# Patient Record
Sex: FEMALE | Race: WHITE | NOT HISPANIC OR LATINO | URBAN - METROPOLITAN AREA
[De-identification: names, ages, dates, MRNs, and addresses within clinical notes are randomized per-mention and may not be internally consistent; named-entity substitution may affect disease eponyms.]

---

## 2021-08-04 ENCOUNTER — APPOINTMENT (OUTPATIENT)
Age: 46
Setting detail: DERMATOLOGY
End: 2021-08-04

## 2021-08-04 DIAGNOSIS — D22 MELANOCYTIC NEVI: ICD-10-CM

## 2021-08-04 DIAGNOSIS — L81.4 OTHER MELANIN HYPERPIGMENTATION: ICD-10-CM

## 2021-08-04 DIAGNOSIS — L71.0 PERIORAL DERMATITIS: ICD-10-CM

## 2021-08-04 DIAGNOSIS — L91.8 OTHER HYPERTROPHIC DISORDERS OF THE SKIN: ICD-10-CM

## 2021-08-04 PROBLEM — D22.4 MELANOCYTIC NEVI OF SCALP AND NECK: Status: ACTIVE | Noted: 2021-08-04

## 2021-08-04 PROBLEM — D23.9 OTHER BENIGN NEOPLASM OF SKIN, UNSPECIFIED: Status: ACTIVE | Noted: 2021-08-04

## 2021-08-04 PROBLEM — D22.9 MELANOCYTIC NEVI, UNSPECIFIED: Status: ACTIVE | Noted: 2021-08-04

## 2021-08-04 PROCEDURE — 99203 OFFICE O/P NEW LOW 30 MIN: CPT

## 2021-08-04 PROCEDURE — ? PRESCRIPTION

## 2021-08-04 PROCEDURE — ? TREATMENT REGIMEN

## 2021-08-04 PROCEDURE — ? COUNSELING

## 2021-08-04 PROCEDURE — ? OBSERVATION

## 2021-08-04 RX ORDER — PIMECROLIMUS 10 MG/G
CREAM TOPICAL
Qty: 1 | Refills: 3 | Status: ERX | COMMUNITY
Start: 2021-08-04

## 2021-08-04 RX ADMIN — PIMECROLIMUS: 10 CREAM TOPICAL at 00:00

## 2021-08-04 ASSESSMENT — LOCATION DETAILED DESCRIPTION DERM
LOCATION DETAILED: RIGHT INFERIOR OCCIPITAL SCALP
LOCATION DETAILED: RIGHT SUPERIOR PARIETAL SCALP
LOCATION DETAILED: LEFT UPPER CUTANEOUS LIP
LOCATION DETAILED: LEFT LOWER CUTANEOUS LIP

## 2021-08-04 ASSESSMENT — LOCATION SIMPLE DESCRIPTION DERM
LOCATION SIMPLE: POSTERIOR SCALP
LOCATION SIMPLE: LEFT LIP
LOCATION SIMPLE: SCALP

## 2021-08-04 ASSESSMENT — LOCATION ZONE DERM
LOCATION ZONE: SCALP
LOCATION ZONE: LIP

## 2021-08-04 NOTE — PROCEDURE: OBSERVATION
Detail Level: Simple
Morphology Per Location (Optional): Smooth firm pink papule
Detail Level: Detailed
Size Of Lesion: 5mm
Morphology Per Location (Optional): Soft flat papule
Size Of Lesion: 8mm x 5mm

## 2021-08-04 NOTE — PROCEDURE: TREATMENT REGIMEN
Discontinue Regimen: Topical steroid (save for flares)
Detail Level: Simple
Continue Regimen: Cerave
Plan: Be sure to keep face masks clean
Initiate Treatment: Pimecrolimus

## 2021-10-05 ENCOUNTER — APPOINTMENT (OUTPATIENT)
Age: 46
Setting detail: DERMATOLOGY
End: 2021-10-05

## 2021-10-05 DIAGNOSIS — L50.8 OTHER URTICARIA: ICD-10-CM

## 2021-10-05 DIAGNOSIS — L71.0 PERIORAL DERMATITIS: ICD-10-CM | Status: WELL CONTROLLED

## 2021-10-05 PROCEDURE — ? TREATMENT REGIMEN

## 2021-10-05 PROCEDURE — 99213 OFFICE O/P EST LOW 20 MIN: CPT | Mod: 95

## 2021-10-05 NOTE — PROCEDURE: TREATMENT REGIMEN
Plan: Call if episodes occur with greater frequency.\\n\\nI asked her to send me a photo she took when the face rash was active.
Continue Regimen: Pimecrolimus
Detail Level: Simple
Initiate Treatment: At first sign of a recurrence, take Allegra 180 mg.

## 2022-04-12 ENCOUNTER — APPOINTMENT (OUTPATIENT)
Age: 47
Setting detail: DERMATOLOGY
End: 2022-04-12

## 2022-04-12 DIAGNOSIS — Z02.9 ENCOUNTER FOR ADMINISTRATIVE EXAMINATIONS, UNSPECIFIED: ICD-10-CM

## 2022-08-18 ENCOUNTER — APPOINTMENT (OUTPATIENT)
Age: 47
Setting detail: DERMATOLOGY
End: 2022-08-18

## 2022-08-18 VITALS — SYSTOLIC BLOOD PRESSURE: 140 MMHG | DIASTOLIC BLOOD PRESSURE: 80 MMHG

## 2022-08-18 DIAGNOSIS — L82.1 OTHER SEBORRHEIC KERATOSIS: ICD-10-CM

## 2022-08-18 DIAGNOSIS — L91.8 OTHER HYPERTROPHIC DISORDERS OF THE SKIN: ICD-10-CM

## 2022-08-18 DIAGNOSIS — L81.4 OTHER MELANIN HYPERPIGMENTATION: ICD-10-CM

## 2022-08-18 DIAGNOSIS — L259 CONTACT DERMATITIS AND OTHER ECZEMA, UNSPECIFIED CAUSE: ICD-10-CM

## 2022-08-18 DIAGNOSIS — D22 MELANOCYTIC NEVI: ICD-10-CM

## 2022-08-18 PROBLEM — D22.4 MELANOCYTIC NEVI OF SCALP AND NECK: Status: ACTIVE | Noted: 2022-08-18

## 2022-08-18 PROBLEM — D22.5 MELANOCYTIC NEVI OF TRUNK: Status: ACTIVE | Noted: 2022-08-18

## 2022-08-18 PROBLEM — D23.5 OTHER BENIGN NEOPLASM OF SKIN OF TRUNK: Status: ACTIVE | Noted: 2022-08-18

## 2022-08-18 PROBLEM — L23.9 ALLERGIC CONTACT DERMATITIS, UNSPECIFIED CAUSE: Status: ACTIVE | Noted: 2022-08-18

## 2022-08-18 PROCEDURE — 99214 OFFICE O/P EST MOD 30 MIN: CPT

## 2022-08-18 PROCEDURE — ? DEFER

## 2022-08-18 PROCEDURE — ? COUNSELING

## 2022-08-18 ASSESSMENT — LOCATION SIMPLE DESCRIPTION DERM
LOCATION SIMPLE: RIGHT ANTERIOR NECK
LOCATION SIMPLE: LEFT UPPER BACK
LOCATION SIMPLE: SCALP
LOCATION SIMPLE: CHEST
LOCATION SIMPLE: RIGHT TEMPLE

## 2022-08-18 ASSESSMENT — LOCATION DETAILED DESCRIPTION DERM
LOCATION DETAILED: LEFT MEDIAL UPPER BACK
LOCATION DETAILED: RIGHT SUPERIOR PARIETAL SCALP
LOCATION DETAILED: RIGHT CENTRAL TEMPLE
LOCATION DETAILED: RIGHT INFERIOR ANTERIOR NECK
LOCATION DETAILED: LEFT LATERAL SUPERIOR CHEST

## 2022-08-18 ASSESSMENT — LOCATION ZONE DERM
LOCATION ZONE: NECK
LOCATION ZONE: TRUNK
LOCATION ZONE: SCALP
LOCATION ZONE: FACE

## 2022-08-18 NOTE — PROCEDURE: DEFER
Detail Level: Simple
Instructions (Optional): Plan removal at 48 hour removal.
Size Of Lesion In Cm (Optional): 0
Introduction Text (Please End With A Colon): ..
Procedure To Be Performed At Next Visit: Patch testing
Procedure To Be Performed At Next Visit: Skin Tag removal

## 2022-08-18 NOTE — HPI: FULL BODY SKIN EXAMINATION
What Is The Reason For Today's Visit?: Full Body Skin Examination
What Is The Reason For Today's Visit? (Being Monitored For X): concerning skin lesions on an annual basis
Additional History: Patient has a raised skin lesion on the right zygoma

## 2022-09-12 ENCOUNTER — APPOINTMENT (OUTPATIENT)
Age: 47
Setting detail: DERMATOLOGY
End: 2022-09-12

## 2022-09-12 DIAGNOSIS — L259 CONTACT DERMATITIS AND OTHER ECZEMA, UNSPECIFIED CAUSE: ICD-10-CM

## 2022-09-12 PROBLEM — L23.9 ALLERGIC CONTACT DERMATITIS, UNSPECIFIED CAUSE: Status: ACTIVE | Noted: 2022-09-12

## 2022-09-12 PROCEDURE — 95044 PATCH/APPLICATION TESTS: CPT

## 2022-09-12 PROCEDURE — ? PATCH TESTING

## 2022-09-12 ASSESSMENT — LOCATION SIMPLE DESCRIPTION DERM: LOCATION SIMPLE: LEFT UPPER BACK

## 2022-09-12 ASSESSMENT — LOCATION DETAILED DESCRIPTION DERM: LOCATION DETAILED: LEFT MEDIAL UPPER BACK

## 2022-09-12 ASSESSMENT — LOCATION ZONE DERM: LOCATION ZONE: TRUNK

## 2022-09-14 ENCOUNTER — APPOINTMENT (OUTPATIENT)
Age: 47
Setting detail: DERMATOLOGY
End: 2022-09-14

## 2022-09-14 DIAGNOSIS — L259 CONTACT DERMATITIS AND OTHER ECZEMA, UNSPECIFIED CAUSE: ICD-10-CM

## 2022-09-14 PROBLEM — L23.9 ALLERGIC CONTACT DERMATITIS, UNSPECIFIED CAUSE: Status: ACTIVE | Noted: 2022-09-14

## 2022-09-14 PROCEDURE — 99212 OFFICE O/P EST SF 10 MIN: CPT

## 2022-09-14 PROCEDURE — ? EXTENDED SERIES READING

## 2022-09-14 NOTE — PROCEDURE: EXTENDED SERIES READING
Allergen 8 Reaction: no reaction
Name Of Allergen 34: Fragrance mix II 14% pet
Name Of Allergen 77: Benzoic acid 5% pet
Name Of Allergen 10: Balsam of [place] 25% pet
Name Of Allergen 39: Polymyxin B sulfate 3% pet
Name Of Allergen 70: Ethylhexylglycerin 5% pet
Name Of Allergen 32: Mercaptobenzothiazole 1% pet
Name Of Allergen 65: Compositae mix II 5% pet
Show Negative Results In The Note?: Yes
Name Of Allergen 64: Marco A 2% pet
Name Of Allergen 41: Mixed dialkyl thioureas 1% pet
Name Of Allergen 40: Cocamidopropyl betaine 1% aq
Name Of Allergen 59: 4-chloro-3-cresol ([facility]) 1% pet
Name Of Allergen 49: Tocopherol 100% (Vitamin E)
Name Of Allergen 26: Benzocaine 5% pet
Name Of Allergen 57: Sesquiterpene lactone mix 0.1% pet
Name Of Allergen 24: Thiuram mix 1% pet
Name Of Allergen 12: Cobalt chloride 1% pet
Name Of Allergen 67: Sorbitan sesquioleate 20% pet
Name Of Allergen 14: Epoxy resin 1% pet
Name Of Allergen 22: Mercapto mix 1% pet
Name Of Allergen 18: Quaternium 15 2% pet
Name Of Allergen 61: Benzophenone-4 2% pet
Name Of Allergen 46: Methyl methacrylate 2% pet
Name Of Allergen 60: Benzalkonium chloride 0.1% pet
Detail Level: Detailed
Name Of Allergen 1: Nickel sulfate2.5% pet
Name Of Allergen 43: Hydroxyethyl methacrylate 2% pet
Name Of Allergen 36: LIdocaine 15% pet
Number Of Patches Read: 80
Name Of Allergen 28: Gold sodium thiosulfate 2% pet
Name Of Allergen 3: Neomycin 20% pet
Name Of Allergen 66: Ethyleneurea melamine-formaldehyde
Name Of Allergen 16: Black Rubber Mix 0.6% pet
Name Of Allergen 2: Lanolin Alcohol 50% pet
Name Of Allergen 50: Ethyl acrylate 0.1% pet
Name Of Allergen 20: p-Phenylenediamine 1% pet
Name Of Allergen 55: Benzophenone-3, 10% pet
Name Of Allergen 52: Chlorhexidine digluconate 0.5% aq
Name Of Allergen 48: Cinnamic aldehyde 1% pet
Name Of Allergen 33: Bacitracin 20% pet
Name Of Allergen 21: Formaldehyde 1% aq
Name Of Allergen 56: Tosylamide formaldehyde resin 10%
Name Of Allergen 6: Fragrance mix I 8% pet
Name Of Allergen 37: Propylene Glycol 30% aq
Name Of Allergen 25: Diazolidinyl urea 1% pet
Name Of Allergen 73: Amidoamine 0.1% aq
Name Of Allergen 51: Tea tree oil 5% pet
Name Of Allergen 30: Budesonide 0.1% pet
What Reading Time Point?: 48 hour
Name Of Allergen 79: 2-ethylhexyl-4-methoxycinnamate 10%
Name Of Allergen 68: n,n-diphenylguanidine 1% pet
Name Of Allergen 76: Disperse Orange 3, 1% pet
Name Of Allergen 80: Benzyl alcohol 10%
Name Of Allergen 78: 2,6-Diter-butyl-4-cresol (BHT) 2%
Name Of Allergen 69: Cetyl stearyl alcohol 20% pet
Name Of Allergen 62: Sodium benzoate 5% pet
Name Of Allergen 35: Disperse blue 106/124 mix 1.0% pet
Name Of Allergen 4: Potassium dichromate 0.25% pet
Name Of Allergen 27: Tixocortol-21-pivalate 1% pet
Name Of Allergen 82: Propyl gallate
Name Of Allergen 58: Cocamide MARGO 0.5% pet
Name Of Allergen 31: Hydrocortisone-17-butyrate 1% pet
Name Of Allergen 19: Methyldibromoglutaronititrile 0.5% pet Euxyl K400
Name Of Allergen 71: Triamcinolone 1% pet
Name Of Allergen 47: Lavender absolute 2% pet
Name Of Allergen 13: p-tert butylphenol formaldehyde resin
Name Of Allergen 44: Oleamidopropyl dimethylamine 0.1%
Name Of Allergen 75: Phenoxyethanol 1% pet
Name Of Allergen 15: Carba mix 3% pet
Allergen 12 Counseling: Carba mix chemicals are used primarily in the production of rubber or latex products. Both natural and synthetic rubber may contain these agents, and sources include the followin. Gloves (household, work, or hospital) 2. Rubber shoes (sneakers, tennis shoes, and the like) 3. Leather shoes (insoles, adhesives,and linings) 4. Sponge makeup applicators and rubber eyelash curlers 5. Rubber in elasticized undergarments and clothing 6. Rubber pillows and sheets 7. Condoms and diaphragms 8. Medical devices 9. Swim wear 10. Tires 11. Renal dialysis equipment 12. Heavy rubber products used in industry 13. Rubber bands 14. Balloons and rubber toys  Other, nonrubber sources of exposure include the following products: 1. Disinfectants, repellents, fungicides, and insecticides used in agriculture 2. Adhesives, cement, sealants 3. Soaps and shampoos  Prevention If patients are carba sensitive and have foot dermatitis, it is probably due to their shoes. They may wear all leather shoes with no inner or outer sole, like moccasins. Molded plastic shoes or wooden clogs can be worn. Patients should contact their local shoe stores and ask for rubber-free shoes or contact an orthotic shop to have shoes custom-made (Box 5-2). If they cannot find such shoes, the insoles should be removed from leather shoes and insoles cut from piano felt, cork, or plastic should be inserted. Sweating should be minimized, and socks that may have absorbed allergens should be discarded.  Patients who are carba sensitive and have hand dermatitis should avoid rubber (latex) gloves and, if possible, wear vinyl gloves only (Box 5-3). If rubber gloves must be worn, manufacturers should be contacted, to acquire carba-free gloves. One important resource that can be contacted if the patient has persistent difficultiesis at the [employer] [address], [place], [place] [address]. Call 1-385.518.7258 or visit them at www.[employer].  Allergic patients should avoid contact with other rubber products as listed earlier and check the chemicals used in their work if they are in the agricultural industries. If such patients work with instruments with rubber tubes and hoses, the instruments should not be touched unless vinyl or carba-free rubber gloves are being worn.
Name Of Allergen 63: Sorbic acid 2% pet
Name Of Allergen 53: Propolis 10% pet
Name Of Allergen 81: Polyethylene glycol
Name Of Allergen 38: Iodopropynyl butylcarbamate 0.1% pet
Name Of Allergen 54: Chloroxylenol (PCMX) 1% pet
Name Of Allergen 42: 3-Dimethylamino-propylamine (DMAPA) 1% aq
Name Of Allergen 17: Methylchlorisothiazolinone (Andrew FOLEY)
Name Of Allergen 5: DMDM hydantoin 1% pet
Name Of Allergen 7: Colophony 20% pet
Name Of Allergen 45: Decyl glucoside 5% pet
Name Of Allergen 74: Ethyl cycnoacrylate 10% pet
Name Of Allergen 11: Ethylenediamine dihydrochloride 1%
Name Of Allergen 23: 2-Bromo-2-nitropopane-1,3 diol (Bronopol) 0.5%
Name Of Allergen 29: Imidazolidinyl urea 2% pet
Name Of Allergen 9: Methylisothiazolinone 0.2% aq
Name Of Allergen 72: Clobetasol-17-proprionate 1% pet
Name Of Allergen 8: Paraben Mix 12% pet

## 2022-09-16 ENCOUNTER — APPOINTMENT (OUTPATIENT)
Age: 47
Setting detail: DERMATOLOGY
End: 2022-09-16

## 2022-09-16 DIAGNOSIS — L259 CONTACT DERMATITIS AND OTHER ECZEMA, UNSPECIFIED CAUSE: ICD-10-CM

## 2022-09-16 PROBLEM — L23.9 ALLERGIC CONTACT DERMATITIS, UNSPECIFIED CAUSE: Status: ACTIVE | Noted: 2022-09-16

## 2022-09-16 PROCEDURE — ? EXTENDED SERIES READING

## 2022-09-16 PROCEDURE — 99213 OFFICE O/P EST LOW 20 MIN: CPT

## 2022-09-16 PROCEDURE — ? TREATMENT REGIMEN

## 2022-09-16 ASSESSMENT — LOCATION ZONE DERM
LOCATION ZONE: TRUNK
LOCATION ZONE: ARM

## 2022-09-16 ASSESSMENT — LOCATION DETAILED DESCRIPTION DERM
LOCATION DETAILED: LEFT MEDIAL UPPER BACK
LOCATION DETAILED: LEFT PROXIMAL DORSAL FOREARM

## 2022-09-16 ASSESSMENT — LOCATION SIMPLE DESCRIPTION DERM
LOCATION SIMPLE: LEFT UPPER BACK
LOCATION SIMPLE: LEFT FOREARM

## 2022-09-16 NOTE — PROCEDURE: TREATMENT REGIMEN
Detail Level: Generalized
Plan: Patient advised to give Dr. Garcia list of ingredients and sunscreen product that she did not have a reaction to and give me the list of sunscreens and ingredients which she did have a reaction to.  Patient advised when she tries a new product to perform a USE test first, which patient states has done recently

## 2022-09-16 NOTE — PROCEDURE: EXTENDED SERIES READING
Allergen 107 Reaction: no reaction
Name Of Allergen 63: Sorbic acid 2% pet
Name Of Allergen 57: Sesquiterpene lactone mix 0.1% pet
Name Of Allergen 61: Benzophenone-4 2% pet
Name Of Allergen 33: Bacitracin 20% pet
Name Of Allergen 28: Gold sodium thiosulfate 2% pet
Name Of Allergen 71: Triamcinolone 1% pet
What Reading Time Point?: 96 hour
Name Of Allergen 20: p-Phenylenediamine 1% pet
Name Of Allergen 38: Iodopropynyl butylcarbamate 0.1% pet
Name Of Allergen 13: p-tert butylphenol formaldehyde resin
Name Of Allergen 12: Cobalt chloride 1% pet
Name Of Allergen 10: Balsam of [place] 25% pet
Name Of Allergen 48: Cinnamic aldehyde 1% pet
Detail Level: Detailed
Name Of Allergen 74: Ethyl cycnoacrylate 10% pet
Name Of Allergen 11: Ethylenediamine dihydrochloride 1%
Name Of Allergen 46: Methyl methacrylate 2% pet
Name Of Allergen 31: Hydrocortisone-17-butyrate 1% pet
Name Of Allergen 55: Benzophenone-3, 10% pet
Name Of Allergen 62: Sodium benzoate 5% pet
Name Of Allergen 4: Potassium dichromate 0.25% pet
Name Of Allergen 76: Disperse Orange 3, 1% pet
Name Of Allergen 23: 2-Bromo-2-nitropopane-1,3 diol (Bronopol) 0.5%
Name Of Allergen 16: Black Rubber Mix 0.6% pet
Name Of Allergen 30: Budesonide 0.1% pet
Name Of Allergen 19: Methyldibromoglutaronititrile 0.5% pet Euxyl K400
Number Of Patches Read: 80
Name Of Allergen 45: Decyl glucoside 5% pet
Name Of Allergen 3: Neomycin 20% pet
Name Of Allergen 8: Paraben Mix 12% pet
Name Of Allergen 25: Diazolidinyl urea 1% pet
Name Of Allergen 32: Mercaptobenzothiazole 1% pet
Name Of Allergen 24: Thiuram mix 1% pet
Name Of Allergen 41: Mixed dialkyl thioureas 1% pet
Name Of Allergen 15: Carba mix 3% pet
Name Of Allergen 43: Hydroxyethyl methacrylate 2% pet
Name Of Allergen 75: Phenoxyethanol 1% pet
Name Of Allergen 49: Tocopherol 100% (Vitamin E)
Name Of Allergen 69: Cetyl stearyl alcohol 20% pet
Name Of Allergen 42: 3-Dimethylamino-propylamine (DMAPA) 1% aq
Name Of Allergen 53: Propolis 10% pet
Name Of Allergen 1: Nickel sulfate2.5% pet
Name Of Allergen 37: Propylene Glycol 30% aq
Name Of Allergen 51: Tea tree oil 5% pet
Name Of Allergen 14: Epoxy resin 1% pet
Name Of Allergen 47: Lavender absolute 2% pet
Name Of Allergen 29: Imidazolidinyl urea 2% pet
Name Of Allergen 78: 2,6-Diter-butyl-4-cresol (BHT) 2%
Show Allergen Counseling In The Note?: Yes
Name Of Allergen 66: Ethyleneurea melamine-formaldehyde
Name Of Allergen 80: Benzyl alcohol 10%
Name Of Allergen 60: Benzalkonium chloride 0.1% pet
Name Of Allergen 2: Lanolin Alcohol 50% pet
Name Of Allergen 34: Fragrance mix II 14% pet
Name Of Allergen 22: Mercapto mix 1% pet
Name Of Allergen 6: Fragrance mix I 8% pet
Name Of Allergen 56: Tosylamide formaldehyde resin 10%
Name Of Allergen 72: Clobetasol-17-proprionate 1% pet
Name Of Allergen 21: Formaldehyde 1% aq
Name Of Allergen 35: Disperse blue 106/124 mix 1.0% pet
Name Of Allergen 40: Cocamidopropyl betaine 1% aq
Name Of Allergen 81: Polyethylene glycol
Name Of Allergen 5: DMDM hydantoin 1% pet
Name Of Allergen 36: LIdocaine 15% pet
Name Of Allergen 73: Amidoamine 0.1% aq
Name Of Allergen 82: Propyl gallate
Name Of Allergen 70: Ethylhexylglycerin 5% pet
Name Of Allergen 17: Methylchlorisothiazolinone (Andrew FOLEY)
Name Of Allergen 67: Sorbitan sesquioleate 20% pet
Name Of Allergen 59: 4-chloro-3-cresol ([facility]) 1% pet
Name Of Allergen 18: Quaternium 15 2% pet
Name Of Allergen 52: Chlorhexidine digluconate 0.5% aq
Name Of Allergen 79: 2-ethylhexyl-4-methoxycinnamate 10%
Name Of Allergen 65: Compositae mix II 5% pet
Name Of Allergen 58: Cocamide MARGO 0.5% pet
Name Of Allergen 9: Methylisothiazolinone 0.2% aq
Name Of Allergen 27: Tixocortol-21-pivalate 1% pet
Allergen 12 Counseling: Carba mix chemicals are used primarily in the production of rubber or latex products. Both natural and synthetic rubber may contain these agents, and sources include the followin. Gloves (household, work, or hospital) 2. Rubber shoes (sneakers, tennis shoes, and the like) 3. Leather shoes (insoles, adhesives,and linings) 4. Sponge makeup applicators and rubber eyelash curlers 5. Rubber in elasticized undergarments and clothing 6. Rubber pillows and sheets 7. Condoms and diaphragms 8. Medical devices 9. Swim wear 10. Tires 11. Renal dialysis equipment 12. Heavy rubber products used in industry 13. Rubber bands 14. Balloons and rubber toys  Other, nonrubber sources of exposure include the following products: 1. Disinfectants, repellents, fungicides, and insecticides used in agriculture 2. Adhesives, cement, sealants 3. Soaps and shampoos  Prevention If patients are carba sensitive and have foot dermatitis, it is probably due to their shoes. They may wear all leather shoes with no inner or outer sole, like moccasins. Molded plastic shoes or wooden clogs can be worn. Patients should contact their local shoe stores and ask for rubber-free shoes or contact an orthotic shop to have shoes custom-made (Box 5-2). If they cannot find such shoes, the insoles should be removed from leather shoes and insoles cut from piano felt, cork, or plastic should be inserted. Sweating should be minimized, and socks that may have absorbed allergens should be discarded.  Patients who are carba sensitive and have hand dermatitis should avoid rubber (latex) gloves and, if possible, wear vinyl gloves only (Box 5-3). If rubber gloves must be worn, manufacturers should be contacted, to acquire carba-free gloves. One important resource that can be contacted if the patient has persistent difficultiesis at the [employer] [address], [place], [place] [address]. Call 1-133.457.8606 or visit them at www.[employer].  Allergic patients should avoid contact with other rubber products as listed earlier and check the chemicals used in their work if they are in the agricultural industries. If such patients work with instruments with rubber tubes and hoses, the instruments should not be touched unless vinyl or carba-free rubber gloves are being worn.
Name Of Allergen 26: Benzocaine 5% pet
Name Of Allergen 64: Marco A 2% pet
Name Of Allergen 77: Benzoic acid 5% pet
Name Of Allergen 68: n,n-diphenylguanidine 1% pet
Name Of Allergen 39: Polymyxin B sulfate 3% pet
Name Of Allergen 44: Oleamidopropyl dimethylamine 0.1%
Name Of Allergen 54: Chloroxylenol (PCMX) 1% pet
Name Of Allergen 50: Ethyl acrylate 0.1% pet
Name Of Allergen 7: Colophony 20% pet

## 2023-03-28 ENCOUNTER — APPOINTMENT (OUTPATIENT)
Age: 48
Setting detail: DERMATOLOGY
End: 2023-03-28

## 2023-03-28 DIAGNOSIS — L91.8 OTHER HYPERTROPHIC DISORDERS OF THE SKIN: ICD-10-CM

## 2023-03-28 DIAGNOSIS — D18.0 HEMANGIOMA: ICD-10-CM

## 2023-03-28 DIAGNOSIS — H01.13 ECZEMATOUS DERMATITIS OF EYELID: ICD-10-CM | Status: WELL CONTROLLED

## 2023-03-28 DIAGNOSIS — L82.1 OTHER SEBORRHEIC KERATOSIS: ICD-10-CM

## 2023-03-28 PROBLEM — D18.01 HEMANGIOMA OF SKIN AND SUBCUTANEOUS TISSUE: Status: ACTIVE | Noted: 2023-03-28

## 2023-03-28 PROBLEM — H01.139 ECZEMATOUS DERMATITIS OF UNSPECIFIED EYE, UNSPECIFIED EYELID: Status: ACTIVE | Noted: 2023-03-28

## 2023-03-28 PROCEDURE — ? SKIN TAG REMOVAL MULTI

## 2023-03-28 PROCEDURE — ? TREATMENT REGIMEN

## 2023-03-28 PROCEDURE — 11102 TANGNTL BX SKIN SINGLE LES: CPT

## 2023-03-28 PROCEDURE — 11200 RMVL SKIN TAGS UP TO&INC 15: CPT | Mod: 59

## 2023-03-28 PROCEDURE — ? BIOPSY BY SHAVE METHOD

## 2023-03-28 PROCEDURE — ? SHAVE REMOVAL

## 2023-03-28 PROCEDURE — 99213 OFFICE O/P EST LOW 20 MIN: CPT | Mod: 25

## 2023-03-28 PROCEDURE — 11300 SHAVE SKIN LESION 0.5 CM/<: CPT | Mod: 59

## 2023-03-28 ASSESSMENT — LOCATION DETAILED DESCRIPTION DERM
LOCATION DETAILED: RIGHT MEDIAL BREAST 3-4:00 REGION
LOCATION DETAILED: LEFT AXILLARY TAIL OF BREAST
LOCATION DETAILED: RIGHT CLAVICULAR SKIN
LOCATION DETAILED: UPPER STERNUM
LOCATION DETAILED: RIGHT CENTRAL ZYGOMA
LOCATION DETAILED: LEFT INFERIOR LATERAL NECK

## 2023-03-28 ASSESSMENT — LOCATION SIMPLE DESCRIPTION DERM
LOCATION SIMPLE: LEFT BREAST
LOCATION SIMPLE: RIGHT CLAVICULAR SKIN
LOCATION SIMPLE: RIGHT BREAST
LOCATION SIMPLE: CHEST
LOCATION SIMPLE: LEFT ANTERIOR NECK
LOCATION SIMPLE: RIGHT ZYGOMA

## 2023-03-28 ASSESSMENT — LOCATION ZONE DERM
LOCATION ZONE: NECK
LOCATION ZONE: TRUNK
LOCATION ZONE: FACE

## 2023-03-28 NOTE — PROCEDURE: SKIN TAG REMOVAL MULTI
Telephone Encounter by Moriah Jones NCMA at 03/31/17 02:41 PM     Author:  Moriah Jones NCMA Service:  (none) Author Type:  Certified Medical Assistant     Filed:  03/31/17 02:41 PM Encounter Date:  3/31/2017 Status:  Signed     :  Moriah Jones NCMA (Certified Medical Assistant)       From: Reji Bang  To: Amy Mariano DO  Sent: 3/31/2017  2:15 PM CDT  Subject: Lab Tests  Test Related Questions    This message is being sent by Micaela Posadas on behalf of Reji Bang    IS IT TRUE THAT MY DADS PSA TEST CAME BACK WITH A NUMBER OF 20?  HE PASSED THAT ON TO ME BUT IT WAS NEVER ENTERED IN HIS MY CHART   SO THAT I COULD SEE.  THANK YOU  MICAELA       Revision History        Date/Time User Provider Type Action    > 03/31/17 02:41 PM Moriah Jones NCMA Certified Medical Assistant Sign    Attribution information within the note text is not available.            
Medical Necessity Clause: This procedure was medically necessary because the lesions that were treated were:
Anesthesia Volume In Cc: 0.5
Detail Level: Detailed
Add Associated Diagnoses If Applicable When Selecting Medical Necessity: Yes
Consent: Written consent obtained and the risks of skin tag removal was reviewed with the patient including but not limited to bleeding, pigmentary change, infection, pain, and remote possibility of scarring.
Medical Necessity Information: It is in your best interest to select a reason for this procedure from the list below. All of these items fulfill various CMS LCD requirements except the new and changing color options.
Hemostasis: Aluminum Chloride
Total Number Of Lesions Treated: 4

## 2023-03-28 NOTE — PROCEDURE: SHAVE REMOVAL
Notification Instructions: Patient will be notified of pathology results. However, patient instructed to call the office if not contacted within 2 weeks.
Hemostasis: Aluminum Chloride
Medical Necessity Information: It is in your best interest to select a reason for this procedure from the list below. All of these items fulfill various CMS LCD requirements except the new and changing color options.
Size Of Lesion In Cm (Required): 0.3
Biopsy Method: curette
Bill For Surgical Tray: no
Was A Bandage Applied: Yes
Anesthesia Type: 1% lidocaine with epinephrine
Post-Care Instructions: I reviewed with the patient in detail post-care instructions. Patient is to keep the biopsy site dry overnight, and then apply bacitracin twice daily until healed. Patient may apply hydrogen peroxide soaks to remove any crusting.
Depth Of Shave: dermis
Wound Care: Petrolatum
Consent was obtained from the patient. The risks and benefits to therapy were discussed in detail. Specifically, the risks of infection, scarring, bleeding, prolonged wound healing, incomplete removal, allergy to anesthesia, nerve injury and recurrence were addressed. Prior to the procedure, the treatment site was clearly identified and confirmed by the patient. All components of Universal Protocol/PAUSE Rule completed.
Medical Necessity Clause: This procedure was medically necessary because the lesion that was treated was:
Billing Type: Third-Party Bill
Anesthesia Volume In Cc: 2.5
Detail Level: Detailed

## 2023-03-28 NOTE — PROCEDURE: TREATMENT REGIMEN
Continue Regimen: Pimecrolimus. Ok to use on eyelids and any other areas that become irritated.
Detail Level: Simple

## 2023-03-28 NOTE — PROCEDURE: BIOPSY BY SHAVE METHOD
X Size Of Lesion In Cm: 0.6
Validate Note Data (See Information Below): Yes
Curettage Text: .
Notification Instructions: Patient will be notified of biopsy results. However, patient instructed to call the office if not contacted within 2 weeks.
Consent: Written consent was obtained and risks were reviewed including but not limited to scarring, infection, bleeding, scabbing, incomplete removal, nerve damage and allergy to anesthesia.
Biopsy Method: 15 blade
Hide Anticipated Plan (Based On Presumed Biopsy Results)?: No
Post-Care Instructions: I reviewed with the patient in detail post-care instructions. Patient is to keep the biopsy site dry overnight, and then apply bacitracin twice daily until healed. Patient may apply hydrogen peroxide soaks to remove any crusting.
Size Of Lesion In Cm: 0.4
Wound Care: Vaseline
Electrodesiccation And Curettage Text: The wound bed was treated with electrodesiccation and curettage after the biopsy was performed.
Billing Type: Third-Party Bill
Information: Selecting Yes will display possible errors in your note based on the variables you have selected. This validation is only offered as a suggestion for you. PLEASE NOTE THAT THE VALIDATION TEXT WILL BE REMOVED WHEN YOU FINALIZE YOUR NOTE. IF YOU WANT TO FAX A PRELIMINARY NOTE YOU WILL NEED TO TOGGLE THIS TO 'NO' IF YOU DO NOT WANT IT IN YOUR FAXED NOTE.
Detail Level: Detailed
Electrodesiccation Text: The wound bed was treated with electrodesiccation after the biopsy was performed.
Anesthesia Type: 0.25% bupivacaine with 1:200,000 epinephrine
Dressing: bandage
Cryotherapy Text: The wound bed was treated with cryotherapy after the biopsy was performed.
Additional Anesthesia Volume In Cc (Will Not Render If 0): 0
Anesthesia Volume In Cc: 0.5
Biopsy Type: H and E
Depth Of Biopsy: dermis
Type Of Destruction Used: Curettage
Hemostasis: Aluminum Chloride and Electrocautery
Silver Nitrate Text: The wound bed was treated with silver nitrate after the biopsy was performed.

## 2023-08-09 ENCOUNTER — APPOINTMENT (OUTPATIENT)
Age: 48
Setting detail: DERMATOLOGY
End: 2023-08-09

## 2023-08-09 DIAGNOSIS — Z02.9 ENCOUNTER FOR ADMINISTRATIVE EXAMINATIONS, UNSPECIFIED: ICD-10-CM

## 2023-10-09 ENCOUNTER — APPOINTMENT (OUTPATIENT)
Age: 48
Setting detail: DERMATOLOGY
End: 2023-10-09

## 2023-10-09 DIAGNOSIS — L259 CONTACT DERMATITIS AND OTHER ECZEMA, UNSPECIFIED CAUSE: ICD-10-CM | Status: IMPROVED

## 2023-10-09 DIAGNOSIS — L81.4 OTHER MELANIN HYPERPIGMENTATION: ICD-10-CM

## 2023-10-09 DIAGNOSIS — L71.0 PERIORAL DERMATITIS: ICD-10-CM | Status: RESOLVED

## 2023-10-09 PROBLEM — D23.5 OTHER BENIGN NEOPLASM OF SKIN OF TRUNK: Status: ACTIVE | Noted: 2023-10-09

## 2023-10-09 PROBLEM — L23.9 ALLERGIC CONTACT DERMATITIS, UNSPECIFIED CAUSE: Status: ACTIVE | Noted: 2023-10-09

## 2023-10-09 PROCEDURE — ? COUNSELING

## 2023-10-09 PROCEDURE — ? OBSERVATION

## 2023-10-09 PROCEDURE — ? TREATMENT REGIMEN

## 2023-10-09 PROCEDURE — 99213 OFFICE O/P EST LOW 20 MIN: CPT

## 2023-10-09 PROCEDURE — ? PATIENT SPECIFIC COUNSELING

## 2023-10-09 ASSESSMENT — LOCATION SIMPLE DESCRIPTION DERM
LOCATION SIMPLE: UPPER BACK
LOCATION SIMPLE: INFERIOR FOREHEAD

## 2023-10-09 ASSESSMENT — LOCATION ZONE DERM
LOCATION ZONE: FACE
LOCATION ZONE: TRUNK

## 2023-10-09 ASSESSMENT — LOCATION DETAILED DESCRIPTION DERM
LOCATION DETAILED: INFERIOR MID FOREHEAD
LOCATION DETAILED: SUPERIOR THORACIC SPINE

## 2023-10-09 NOTE — PROCEDURE: PATIENT SPECIFIC COUNSELING
Symptoms resolved when she went gluten free
Detail Level: Zone
Although patch testing was negative she has not tried any sunscreens yet that was proved for use in gluten sensitive patients.

## 2023-10-09 NOTE — PROCEDURE: OBSERVATION
Size Of Lesion In Cm (Optional): 0
Body Location Override (Optional - Billing Will Still Be Based On Selected Body Map Location If Applicable): face
Detail Level: Generalized
Detail Level: Zone